# Patient Record
Sex: FEMALE | Race: WHITE | HISPANIC OR LATINO | Employment: FULL TIME | URBAN - METROPOLITAN AREA
[De-identification: names, ages, dates, MRNs, and addresses within clinical notes are randomized per-mention and may not be internally consistent; named-entity substitution may affect disease eponyms.]

---

## 2020-01-22 DIAGNOSIS — Z12.31 ENCOUNTER FOR SCREENING MAMMOGRAM FOR MALIGNANT NEOPLASM OF BREAST: Primary | ICD-10-CM

## 2020-01-23 ENCOUNTER — HOSPITAL ENCOUNTER (OUTPATIENT)
Dept: RADIOLOGY | Facility: HOSPITAL | Age: 45
Discharge: HOME | End: 2020-01-23
Attending: NURSE PRACTITIONER

## 2020-01-23 DIAGNOSIS — Z12.31 ENCOUNTER FOR SCREENING MAMMOGRAM FOR MALIGNANT NEOPLASM OF BREAST: ICD-10-CM

## 2020-01-23 PROCEDURE — 77067 SCR MAMMO BI INCL CAD: CPT | Mod: TC,PO

## 2022-02-19 ENCOUNTER — HOSPITAL ENCOUNTER (EMERGENCY)
Facility: HOSPITAL | Age: 47
Discharge: HOME | End: 2022-02-19
Attending: EMERGENCY MEDICINE

## 2022-02-19 VITALS
RESPIRATION RATE: 18 BRPM | OXYGEN SATURATION: 99 % | DIASTOLIC BLOOD PRESSURE: 62 MMHG | TEMPERATURE: 98 F | SYSTOLIC BLOOD PRESSURE: 100 MMHG | HEART RATE: 67 BPM

## 2022-02-19 DIAGNOSIS — R10.2 PELVIC PAIN: ICD-10-CM

## 2022-02-19 DIAGNOSIS — N83.209 CYST OF OVARY, UNSPECIFIED LATERALITY: ICD-10-CM

## 2022-02-19 DIAGNOSIS — R10.31 RIGHT LOWER QUADRANT ABDOMINAL PAIN: Primary | ICD-10-CM

## 2022-02-19 LAB
ALBUMIN SERPL BCP-MCNC: 4.5 G/DL (ref 3.5–5.2)
ALP SERPL-CCNC: 45 U/L (ref 55–135)
ALT SERPL W/O P-5'-P-CCNC: 14 U/L (ref 10–44)
ANION GAP SERPL CALC-SCNC: 7 MMOL/L (ref 8–16)
AST SERPL-CCNC: 18 U/L (ref 10–40)
BASOPHILS # BLD AUTO: 0.03 K/UL (ref 0–0.2)
BASOPHILS NFR BLD: 0.5 % (ref 0–1.9)
BILIRUB SERPL-MCNC: 0.7 MG/DL (ref 0.1–1)
BILIRUB UR QL STRIP: NEGATIVE
BUN SERPL-MCNC: 11 MG/DL (ref 6–20)
CALCIUM SERPL-MCNC: 9.2 MG/DL (ref 8.7–10.5)
CHLORIDE SERPL-SCNC: 105 MMOL/L (ref 95–110)
CLARITY UR: CLEAR
CO2 SERPL-SCNC: 25 MMOL/L (ref 23–29)
COLOR UR: COLORLESS
CREAT SERPL-MCNC: 0.7 MG/DL (ref 0.5–1.4)
DIFFERENTIAL METHOD: NORMAL
EOSINOPHIL # BLD AUTO: 0.2 K/UL (ref 0–0.5)
EOSINOPHIL NFR BLD: 2.7 % (ref 0–8)
ERYTHROCYTE [DISTWIDTH] IN BLOOD BY AUTOMATED COUNT: 13.8 % (ref 11.5–14.5)
EST. GFR  (AFRICAN AMERICAN): >60 ML/MIN/1.73 M^2
EST. GFR  (NON AFRICAN AMERICAN): >60 ML/MIN/1.73 M^2
GLUCOSE SERPL-MCNC: 90 MG/DL (ref 70–110)
GLUCOSE UR QL STRIP: NEGATIVE
HCT VFR BLD AUTO: 40.1 % (ref 37–48.5)
HGB BLD-MCNC: 13.4 G/DL (ref 12–16)
HGB UR QL STRIP: ABNORMAL
IMM GRANULOCYTES # BLD AUTO: 0.02 K/UL (ref 0–0.04)
IMM GRANULOCYTES NFR BLD AUTO: 0.3 % (ref 0–0.5)
KETONES UR QL STRIP: NEGATIVE
LEUKOCYTE ESTERASE UR QL STRIP: NEGATIVE
LIPASE SERPL-CCNC: 42 U/L (ref 4–60)
LYMPHOCYTES # BLD AUTO: 1.4 K/UL (ref 1–4.8)
LYMPHOCYTES NFR BLD: 23.8 % (ref 18–48)
MAGNESIUM SERPL-MCNC: 2 MG/DL (ref 1.6–2.6)
MCH RBC QN AUTO: 28.4 PG (ref 27–31)
MCHC RBC AUTO-ENTMCNC: 33.4 G/DL (ref 32–36)
MCV RBC AUTO: 85 FL (ref 82–98)
MONOCYTES # BLD AUTO: 0.4 K/UL (ref 0.3–1)
MONOCYTES NFR BLD: 7.2 % (ref 4–15)
NEUTROPHILS # BLD AUTO: 3.8 K/UL (ref 1.8–7.7)
NEUTROPHILS NFR BLD: 65.5 % (ref 38–73)
NITRITE UR QL STRIP: NEGATIVE
NRBC BLD-RTO: 0 /100 WBC
PH UR STRIP: 6 [PH] (ref 5–8)
PLATELET # BLD AUTO: 226 K/UL (ref 150–450)
PMV BLD AUTO: 11 FL (ref 9.2–12.9)
POTASSIUM SERPL-SCNC: 3.8 MMOL/L (ref 3.5–5.1)
PROT SERPL-MCNC: 7.8 G/DL (ref 6–8.4)
PROT UR QL STRIP: NEGATIVE
RBC # BLD AUTO: 4.72 M/UL (ref 4–5.4)
SODIUM SERPL-SCNC: 137 MMOL/L (ref 136–145)
SP GR UR STRIP: 1 (ref 1–1.03)
URN SPEC COLLECT METH UR: ABNORMAL
UROBILINOGEN UR STRIP-ACNC: NEGATIVE EU/DL
WBC # BLD AUTO: 5.83 K/UL (ref 3.9–12.7)

## 2022-02-19 PROCEDURE — 83690 ASSAY OF LIPASE: CPT | Performed by: EMERGENCY MEDICINE

## 2022-02-19 PROCEDURE — 81003 URINALYSIS AUTO W/O SCOPE: CPT | Performed by: EMERGENCY MEDICINE

## 2022-02-19 PROCEDURE — 85025 COMPLETE CBC W/AUTO DIFF WBC: CPT | Performed by: EMERGENCY MEDICINE

## 2022-02-19 PROCEDURE — 25500020 PHARM REV CODE 255: Performed by: EMERGENCY MEDICINE

## 2022-02-19 PROCEDURE — 99285 EMERGENCY DEPT VISIT HI MDM: CPT | Mod: 25

## 2022-02-19 PROCEDURE — 80053 COMPREHEN METABOLIC PANEL: CPT | Performed by: EMERGENCY MEDICINE

## 2022-02-19 PROCEDURE — 83735 ASSAY OF MAGNESIUM: CPT | Performed by: EMERGENCY MEDICINE

## 2022-02-19 RX ADMIN — IOHEXOL 100 ML: 350 INJECTION, SOLUTION INTRAVENOUS at 01:02

## 2022-02-19 NOTE — DISCHARGE INSTRUCTIONS
Motrin or Tylenol for pain  Please follow-up your primary care provider and OBGYN as discussed  Return if your condition becomes worse, he developed fever, vomiting, or any concerns

## 2022-02-19 NOTE — ED NOTES
46 YOF c/o RLQ pain 8/10, neck pain and right lower back pain, and nauseated X 3 days with no improvement. Also c/o dysuria. Pt stated no relief with OTC medication. -v/d. PMH Cervical Ca.  Denies any other complaints.     Adult Physical Assessment  LOC: Patient is awake, alert, oriented and speaking appropriately at this time.  APPEARANCE: Patient resting comfortably and appears to be in no acute distress at this time. Patient is clean and well groomed, patient's clothing is properly fastened.  SKIN:The skin is warm and dry, color consistent with ethnicity, patient has normal skin turgor and moist mucus membranes, skin intact, no breakdown or brusing noted.  MUSCULOSKELETAL: Patient moving all extremities well, no obvious swelling or deformities noted.  RESPIRATORY: Airway is open and patent, respirations are spontaneous, patient has a normal effort and rate, no accessory muscle use noted.  CARDIAC: Patient has a normal rate and rhythm, no periphreal edema noted in any extremity, capillary refill < 3 seconds in all extremities.  ABDOMEN: Soft and non tender to palpation, no abdominal distention noted.  NEUROLOGIC: Eyes open spontaneously, behavior appropriate to situation, follows commands, facial expression symmetrical, bilateral hand grasp equal and even, purposeful motor response noted, normal sensation in all extremities when touched with a finger.      VSS. ED workup in progress. Call light within pt reach. Safety measures in place: side rails up X2. Will continue to monitor.

## 2022-02-19 NOTE — ED PROVIDER NOTES
Encounter Date: 2/19/2022       History     Chief Complaint   Patient presents with    Abdominal Pain     X 3 DAYS    Back Pain    Vomiting    Dysuria     46-year-old  speaking female presents emergency department with complaint of right lower quadrant abdominal pain for the last 3 days with associated nausea.  Patient denies vomiting or diarrhea.  She has had no fever during her illness.  Patient has had a previous partial hysterectomy she reports that she has had a diagnosis of ovarian cancer in 2017 she did receive chemotherapy.  Although the triage note reports dysuria patient denies difficulty urinating she does have a urine sample noted on the counter which is essentially clear.  The patient has had no vaginal discharge or bleeding.  She also is complaining of some right thoracic pain, and right lower back pain, right lateral neck pain.        Review of patient's allergies indicates:  No Known Allergies  Past Medical History:   Diagnosis Date    Cancer      Past Surgical History:   Procedure Laterality Date    HYSTERECTOMY       No family history on file.     Review of Systems   Constitutional: Negative for chills and fever.   HENT: Negative.    Respiratory: Negative.    Cardiovascular: Negative.    Gastrointestinal: Positive for abdominal pain and nausea. Negative for diarrhea and vomiting.   Genitourinary: Positive for flank pain.   Musculoskeletal: Positive for back pain.   Skin: Negative.    Allergic/Immunologic: Negative.    Neurological: Negative.    Hematological: Negative.    Psychiatric/Behavioral: Negative.    All other systems reviewed and are negative.      Physical Exam     Initial Vitals [02/19/22 1140]   BP Pulse Resp Temp SpO2   120/70 72 20 98.5 °F (36.9 °C) 100 %      MAP       --         Physical Exam    Nursing note and vitals reviewed.  Constitutional: She appears well-developed and well-nourished.   HENT:   Head: Normocephalic.   Right Ear: External ear normal.   Left Ear:  External ear normal.   Eyes: EOM are normal. Pupils are equal, round, and reactive to light.   Neck: Neck supple.   Normal range of motion.  Cardiovascular: Normal rate, regular rhythm, normal heart sounds and intact distal pulses.   Abdominal: Abdomen is soft. Bowel sounds are normal. There is abdominal tenderness in the right lower quadrant.   Mild tenderness to the right lower quadrant   There is right CVA tenderness.  Musculoskeletal:         General: Normal range of motion.      Cervical back: Normal range of motion and neck supple.     Neurological: She is alert and oriented to person, place, and time. She has normal strength. GCS score is 15. GCS eye subscore is 4. GCS verbal subscore is 5. GCS motor subscore is 6.   Skin: Skin is warm and dry. No rash noted.   Psychiatric: She has a normal mood and affect.         ED Course   Procedures  Labs Reviewed   URINALYSIS, REFLEX TO URINE CULTURE - Abnormal; Notable for the following components:       Result Value    Color, UA Colorless (*)     Occult Blood UA Trace (*)     All other components within normal limits    Narrative:     Specimen Source->Urine   COMPREHENSIVE METABOLIC PANEL - Abnormal; Notable for the following components:    Alkaline Phosphatase 45 (*)     Anion Gap 7 (*)     All other components within normal limits   CBC W/ AUTO DIFFERENTIAL   LIPASE   MAGNESIUM          Imaging Results          US Pelvis Comp with Transvag NON-OB (xpd) (Final result)  Result time 02/19/22 15:15:29   Procedure changed from US Pelvis Complete Non OB     Final result by Abdiel Ware Jr., MD (02/19/22 15:15:29)                 Narrative:    Examination: Pelvic ultrasound (transabdominal and transvaginal)    CLINICAL HISTORY: Right lower quadrant pain    TECHNIQUE: Realtime sonographic evaluation of the pelvic organs was employed utilizing both gray scale and color flow imaging.    FINDINGS: Post hysterectomy pelvis with normal appearance of both ovaries which  maintain normal echogenicity and overall Doppler signal on Doppler inspection. No free fluid or adnexal masses are identified within either side the pelvic cavity. No indication of torsion based on Doppler inspection.    IMPRESSION: Unremarkable post hysterectomy pelvis. Both ovaries are normal in sonographic appearance.    Electronically signed by:  Abdiel Ware MD  2/19/2022 3:15 PM CST Workstation: [id number]                             CT Abdomen Pelvis With Contrast (Final result)  Result time 02/19/22 14:01:05    Final result by Abdiel Ware Jr., MD (02/19/22 14:01:05)                 Narrative:    EXAM: CT ABDOMEN AND PELVIS WITH CONTRAST CT ABDOMEN PELVIS WITH CONTRAST    CLINICAL INDICATION: Female, 46 years old. RLQ abdominal pain (Age >= 14y)    TECHNIQUE: CT abdomen and pelvis was performed, following the administration of contrast, as per department protocol. Axial, sagittal, and coronal reconstructions were obtained.    IV CONTRAST: 100 cc of Omnipaque 350    ORAL CONTRAST: Yes.    RADIATION DOSE REDUCTION:This exam was performed according to the departmental dose-optimization program which includes automated exposure control, adjustment of the mA and/or kV according to patient size and/or use of iterative reconstruction technique.    COMPARISON: None    FINDINGS:  LOWER CHEST:  No pathologic process in imaged portion of lower chest    LIVER:  No pathologic process.    GALLBLADDER:  Normal distended without pathologic findings. No wall thickening, pericholecystic fluid, or intraluminal stone formation.    BILE DUCTS:  No pathologic process.    PANCREAS:  No pathologic process.    SPLEEN:  No pathologic process.    ADRENALS:  No pathologic process.    KIDNEYS AND URETERS:  No pathologic process.    URINARY BLADDER:  No pathologic process.    GASTROINTESTINAL TRACT:  No pathologic process.    APPENDIX:  No inflammatory changes in region of appendix.    LYMPH NODES:  No  lymphadenopathy.    PERITONEUM/MESENTERY:  No free air, significant free fluid, mass or fluid collection.    VESSELS:  No vascular abnormality.    ADDITIONAL RETROPERITONEAL FINDINGS:  None.    REPRODUCTIVE ORGANS:There is a small crenulated focus of hyperenhancement occupying the left ovary measuring 1.5 cm in the widest dimension compatible with an involuting cyst formation. Right ovary is normal in size and overall contour. The patient has undergone previous hysterectomy. There is mild thickening at the level of the vaginal apex.    ABDOMINAL AND PELVIC WALLS:  No pathologic process.    MUSCULOSKELETAL:  No pathologic process.    ADDITIONAL FINDINGS:  None.    IMPRESSION:    1. No CT evidence of acute intra-abdominal pathology.  2. 1 cm crenulated focus encompassing the left ovary compatible with a involuting cyst.    Standardized Report:  [id number].    Electronically signed by:  Abdiel Ware MD  2/19/2022 2:01 PM CST Workstation: [id number]                             CTA Chest Non-Coronary - PE Study (Final result)  Result time 02/19/22 13:55:09    Final result by Abdiel Ware Jr., MD (02/19/22 13:55:09)                 Narrative:    CMS MANDATED QUALITY DATA-CT RADIATION-436    HISTORY: Patient document history of pulmonary embolism suspected. Indeterminate d-dimer status.    TECHNIQUE:: Thin axial imaging through the chest was performed with 100 cc of Omnipaque 350 IV contrast, with sagittal and coronal images, MIPS, and or 3D reconstructions performed. All CT exams at this facility use dose modulation, iterative reconstruction, and or weight based dosing when appropriate to reduce radiation dose to as low as reasonably achievable.    FINDINGS:    Examination is of diagnostic quality as there is normal opacification of the pulmonary arterial tree out to the tertiary order branch vessels. No evidence of pruning, webbing, or truncation of pulmonary arterial system. No pulmonary  thromboembolism. The main pulmonary trunk appears normal and there is no evidence of saddle embolus branch point of the main pulmonary artery. Right and left pulmonary arteries and respective divisions are normal in caliber without evidence of intraluminal occlusion or thrombus formation.    Parenchymal lung windows settings demonstrate geographic areas of groundglass opacity changes on the long-standing basis intermixed amongst areas of normal-appearing lung may reflect small airway disease with constricted alveolitis and retention of air due to small airway disease. No evidence of lobar or sublobar consolidation changes. No pneumonia.    Evaluation of the mediastinum maintains normal appearance of the aortic outline as well as the major pulmonary vessels. No lobe lymphadenopathy.    Upper abdominal cavity is normal in CT appearance and the gallbladder is mildly distended without pathologic findings.    IMPRESSION:  1. No CT evidence of acute pulmonary thromboembolism.  2. 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  3. No pneumonia.    Electronically signed by:  Abdiel Ware MD  2/19/2022 1:55 PM CST Workstation: [id number]                             X-Ray Chest AP Portable (Final result)  Result time 02/19/22 12:42:08    Final result by Abdiel Ware Jr., MD (02/19/22 12:42:08)                 Narrative:    CHEST X-RAY SINGLE VIEW    HISTORY: Chest pain    FINDINGS:   A single view of the chest was performed without the benefit of previous comparison.  The heart size and pulmonary vascularity are within the range of normal.  There is no significant hilar nor mediastinal process.  The aerated lungs are well expanded and clear.  The right and left CP angles are rather sharp.  The osseous structures show nothing unusual.    IMPRESSION:   Negative  chest.    Electronically signed by:  Abdiel Ware MD  2/19/2022 12:42 PM CST Workstation: [id number]                               Medications   iohexoL (OMNIPAQUE 350) injection 100 mL (100 mLs Intravenous Given 2/19/22 1575)     Medical Decision Making:   Initial Assessment:   46-year-old  speaking female presents emergency department with complaint of right lower quadrant abdominal pain for the last 3 days with associated nausea.  Patient denies vomiting or diarrhea.  She has had no fever during her illness.  Patient has had a previous partial hysterectomy she reports that she has had a diagnosis of ovarian cancer in 2017 she did receive chemotherapy.  Although the triage note reports dysuria patient denies difficulty urinating she does have a urine sample noted on the counter which is essentially clear.  The patient has had no vaginal discharge or bleeding.  She also is complaining of some right thoracic pain, and right lower back pain, right lateral neck pain.        Differential Diagnosis:   Appendicitis, right ovarian cyst, torsion, UTI, ureterolithiasis, pyelonephritis, PE  ED Management:  46-year-old  speaking female presents to the emergency department Stephanie language line was used for my history and physical and discharge instructions.  This patient has had a 3 day history of right lower abdominal pain with associated nausea denies any vomiting or diarrhea patient has had no fever.  Patient denies dark tarry stools she has had a previous partial hysterectomy secondary to cervical cancer in 2017 she did have chemotherapy.  Patient states she has no current gyn.  Patient's labs unremarkable urinalysis reveals no urinary tract infection CT imaging of the chest performed secondary to patient complaining pain to the right thoracic back however the pain is reproducible with palpation on exam however secondary to patient's past medical history of cervical CA and increased risk of PE CTA  performed there is no evidence of pulmonary embolism noted.  Further evaluation CT of the abdomen and pelvis performed no evidence of acute appendicitis patient does have a small left ovarian cyst.  Pelvic ultrasound performed no evidence of ovarian torsion noted.  Patient was given instructions and discharged via Stephanie language line she was instructed that she could have an early appendicitis that she must return if she develops fever, worsening pain, vomiting, or any concerns she was also instructed to follow-up with gyn secondary to previous history of cervical CA.             Attending Attestation:     Physician Attestation Statement for NP/PA:       Other NP/PA Attestation Additions:    History of Present Illness: I was not called upon to see this patient but was available for consultation               ED Course as of 02/19/22 1744   Sat Feb 19, 2022   1500 Waiting for ultrasound report  [MP]      ED Course User Index  [MP] JANE Mccarty             Clinical Impression:   Final diagnoses:  [R10.31] Right lower quadrant abdominal pain (Primary)  [N83.209] Cyst of ovary, unspecified laterality  [R10.2] Pelvic pain          ED Disposition Condition    Discharge Stable        ED Prescriptions     None        Follow-up Information     Follow up With Specialties Details Why Contact Info    Snehal Cast NP Internal Medicine Schedule an appointment as soon as possible for a visit in 2 days  [address] [facility] [address]  [place] [address]  [phone]      Noelle Lee MD Obstetrics, Obstetrics and Gynecology Schedule an appointment as soon as possible for a visit in 2 days  [address] [facility]  [address] [place] [address] OBSTETRIC & GYNECOLOGY  [place] [address]  [phone]             JANE Mccarty  02/19/22 5160       Torrey Diaz MD  02/19/22 1233

## 2022-09-25 ENCOUNTER — HOSPITAL ENCOUNTER (EMERGENCY)
Facility: HOSPITAL | Age: 47
Discharge: HOME | End: 2022-09-25
Attending: EMERGENCY MEDICINE

## 2022-09-25 VITALS
HEIGHT: 60 IN | HEART RATE: 78 BPM | SYSTOLIC BLOOD PRESSURE: 120 MMHG | TEMPERATURE: 97 F | OXYGEN SATURATION: 100 % | DIASTOLIC BLOOD PRESSURE: 78 MMHG | WEIGHT: 140 LBS | RESPIRATION RATE: 16 BRPM | BODY MASS INDEX: 27.48 KG/M2

## 2022-09-25 DIAGNOSIS — L03.90 CELLULITIS, UNSPECIFIED CELLULITIS SITE: ICD-10-CM

## 2022-09-25 DIAGNOSIS — L02.91 ABSCESS: Primary | ICD-10-CM

## 2022-09-25 PROCEDURE — 99283 EMERGENCY DEPT VISIT LOW MDM: CPT

## 2022-09-25 PROCEDURE — 25000003 PHARM REV CODE 250: Performed by: EMERGENCY MEDICINE

## 2022-09-25 RX ORDER — SULFAMETHOXAZOLE AND TRIMETHOPRIM 800; 160 MG/1; MG/1
1 TABLET ORAL 2 TIMES DAILY
Qty: 20 TABLET | Refills: 0 | Status: SHIPPED | OUTPATIENT
Start: 2022-09-25 | End: 2022-10-05

## 2022-09-25 RX ORDER — MUPIROCIN 20 MG/G
OINTMENT TOPICAL 3 TIMES DAILY
Qty: 22 G | Refills: 0 | Status: SHIPPED | OUTPATIENT
Start: 2022-09-25 | End: 2022-10-05

## 2022-09-25 RX ORDER — SULFAMETHOXAZOLE AND TRIMETHOPRIM 800; 160 MG/1; MG/1
1 TABLET ORAL 2 TIMES DAILY
Qty: 14 TABLET | Refills: 0 | Status: SHIPPED | OUTPATIENT
Start: 2022-09-25 | End: 2022-09-25 | Stop reason: SDUPTHER

## 2022-09-25 RX ORDER — SULFAMETHOXAZOLE AND TRIMETHOPRIM 800; 160 MG/1; MG/1
1 TABLET ORAL
Status: COMPLETED | OUTPATIENT
Start: 2022-09-25 | End: 2022-09-25

## 2022-09-25 RX ADMIN — SULFAMETHOXAZOLE AND TRIMETHOPRIM 1 TABLET: 800; 160 TABLET ORAL at 09:09

## 2022-09-26 NOTE — ED PROVIDER NOTES
Encounter Date: 9/25/2022       History     Chief Complaint   Patient presents with    Wound Check     Wound to left leg, patient states it started as a mosquito bite 3 days ago, pt reports fever at home,      Patient presents complaining of left anterior shin redness and discomfort.  Symptoms have been present for last few days.  She denies fever chills.  She has some mild pain in the left groin.  She denies any streaking up the leg.  At the worst symptoms are mild-to-moderate.    Review of patient's allergies indicates:  No Known Allergies  Past Medical History:   Diagnosis Date    Cancer      Past Surgical History:   Procedure Laterality Date    HYSTERECTOMY       No family history on file.  Social History     Tobacco Use    Smoking status: Never   Substance Use Topics    Alcohol use: No    Drug use: No     Review of Systems   All other systems reviewed and are negative.    Physical Exam     Initial Vitals [09/25/22 2117]   BP Pulse Resp Temp SpO2   120/83 78 16 97.3 °F (36.3 °C) 100 %      MAP       --         Physical Exam    Nursing note and vitals reviewed.  Constitutional: She appears well-developed and well-nourished.   Pleasant, polite   HENT:   Head: Normocephalic and atraumatic.   Eyes: EOM are normal.   Neck: Neck supple.   Normal range of motion.  Pulmonary/Chest: No respiratory distress.   Musculoskeletal:      Cervical back: Normal range of motion and neck supple.      Comments: The left anterior shin has an area of erythema and warmth with associated mild purulent drainage.  There is no fluctuance.  There is no streaking up the leg.     Neurological: She is alert and oriented to person, place, and time.   Skin: Skin is warm and dry. Capillary refill takes less than 2 seconds.   Psychiatric: She has a normal mood and affect. Her behavior is normal. Judgment and thought content normal.       ED Course   Procedures  Labs Reviewed - No data to display       Imaging Results    None          Medications    sulfamethoxazole-trimethoprim 800-160mg per tablet 1 tablet (1 tablet Oral Given 9/25/22 2141)     Medical Decision Making:   Initial Assessment:   No apparent distress  Differential Diagnosis:   Abscess, cellulitis  ED Management:  Patient has no need for incision and drainage at the time patient has open draining abscess with some mild surrounding cellulitis.  She was provided Bactrim in the emergency department and was given prescription for Bactrim.  Patient be discharged stable condition.  Detailed return precautions discussed                        Clinical Impression:   Final diagnoses:  [L02.91] Abscess (Primary)  [L03.90] Cellulitis, unspecified cellulitis site        ED Disposition Condition    Discharge Stable          ED Prescriptions       Medication Sig Dispense Start Date End Date Auth. Provider    sulfamethoxazole-trimethoprim 800-160mg (BACTRIM DS) 800-160 mg Tab  (Status: Discontinued) Take 1 tablet by mouth 2 (two) times daily. for 10 days 14 tablet 9/25/2022 9/25/2022 Torrey King MD    sulfamethoxazole-trimethoprim 800-160mg (BACTRIM DS) 800-160 mg Tab Take 1 tablet by mouth 2 (two) times daily. for 10 days 20 tablet 9/25/2022 10/5/2022 Torrey King MD    mupirocin (BACTROBAN) 2 % ointment Apply topically 3 (three) times daily. for 10 days 22 g 9/25/2022 10/5/2022 Torrey King MD          Follow-up Information       Follow up With Specialties Details Why Contact Info    Snehal Cast NP Internal Medicine In 1 week  [address] [place] [id number]  [phone]               Torrey King MD  09/25/22 3326

## 2024-04-08 ENCOUNTER — HOSPITAL ENCOUNTER (EMERGENCY)
Facility: HOSPITAL | Age: 49
Discharge: HOME | End: 2024-04-08
Attending: EMERGENCY MEDICINE

## 2024-04-08 VITALS
DIASTOLIC BLOOD PRESSURE: 70 MMHG | HEIGHT: 64 IN | HEART RATE: 72 BPM | TEMPERATURE: 98 F | SYSTOLIC BLOOD PRESSURE: 108 MMHG | OXYGEN SATURATION: 98 % | WEIGHT: 160 LBS | BODY MASS INDEX: 27.31 KG/M2 | RESPIRATION RATE: 16 BRPM

## 2024-04-08 DIAGNOSIS — R10.84 GENERALIZED ABDOMINAL PAIN: Primary | ICD-10-CM

## 2024-04-08 DIAGNOSIS — K52.9 COLITIS: ICD-10-CM

## 2024-04-08 LAB
ALBUMIN SERPL BCP-MCNC: 4.2 G/DL (ref 3.5–5.2)
ALP SERPL-CCNC: 44 U/L (ref 55–135)
ALT SERPL W/O P-5'-P-CCNC: 9 U/L (ref 10–44)
ANION GAP SERPL CALC-SCNC: 5 MMOL/L (ref 8–16)
AST SERPL-CCNC: 15 U/L (ref 10–40)
BACTERIA #/AREA URNS HPF: ABNORMAL /HPF
BASOPHILS # BLD AUTO: 0.04 K/UL (ref 0–0.2)
BASOPHILS NFR BLD: 0.3 % (ref 0–1.9)
BILIRUB SERPL-MCNC: 0.3 MG/DL (ref 0.1–1)
BILIRUB UR QL STRIP: NEGATIVE
BUN SERPL-MCNC: 20 MG/DL (ref 6–20)
CALCIUM SERPL-MCNC: 9.4 MG/DL (ref 8.7–10.5)
CHLORIDE SERPL-SCNC: 108 MMOL/L (ref 95–110)
CLARITY UR: CLEAR
CO2 SERPL-SCNC: 26 MMOL/L (ref 23–29)
COLOR UR: YELLOW
CREAT SERPL-MCNC: 0.7 MG/DL (ref 0.5–1.4)
DIFFERENTIAL METHOD BLD: ABNORMAL
EOSINOPHIL # BLD AUTO: 0.1 K/UL (ref 0–0.5)
EOSINOPHIL NFR BLD: 0.5 % (ref 0–8)
ERYTHROCYTE [DISTWIDTH] IN BLOOD BY AUTOMATED COUNT: 13.9 % (ref 11.5–14.5)
EST. GFR  (NO RACE VARIABLE): >60 ML/MIN/1.73 M^2
GLUCOSE SERPL-MCNC: 127 MG/DL (ref 70–110)
GLUCOSE UR QL STRIP: NEGATIVE
HCT VFR BLD AUTO: 37.5 % (ref 37–48.5)
HGB BLD-MCNC: 12.4 G/DL (ref 12–16)
HGB UR QL STRIP: ABNORMAL
IMM GRANULOCYTES # BLD AUTO: 0.02 K/UL (ref 0–0.04)
IMM GRANULOCYTES NFR BLD AUTO: 0.2 % (ref 0–0.5)
KETONES UR QL STRIP: NEGATIVE
LEUKOCYTE ESTERASE UR QL STRIP: NEGATIVE
LIPASE SERPL-CCNC: 39 U/L (ref 4–60)
LYMPHOCYTES # BLD AUTO: 1.1 K/UL (ref 1–4.8)
LYMPHOCYTES NFR BLD: 8.7 % (ref 18–48)
MCH RBC QN AUTO: 29.1 PG (ref 27–31)
MCHC RBC AUTO-ENTMCNC: 33.1 G/DL (ref 32–36)
MCV RBC AUTO: 88 FL (ref 82–98)
MICROSCOPIC COMMENT: ABNORMAL
MONOCYTES # BLD AUTO: 0.6 K/UL (ref 0.3–1)
MONOCYTES NFR BLD: 4.5 % (ref 4–15)
NEUTROPHILS # BLD AUTO: 10.8 K/UL (ref 1.8–7.7)
NEUTROPHILS NFR BLD: 85.8 % (ref 38–73)
NITRITE UR QL STRIP: NEGATIVE
NRBC BLD-RTO: 0 /100 WBC
PH UR STRIP: 6 [PH] (ref 5–8)
PLATELET # BLD AUTO: 212 K/UL (ref 150–450)
PMV BLD AUTO: 11.3 FL (ref 9.2–12.9)
POTASSIUM SERPL-SCNC: 3.7 MMOL/L (ref 3.5–5.1)
PROT SERPL-MCNC: 7 G/DL (ref 6–8.4)
PROT UR QL STRIP: ABNORMAL
RBC # BLD AUTO: 4.26 M/UL (ref 4–5.4)
RBC #/AREA URNS HPF: 10 /HPF (ref 0–4)
SODIUM SERPL-SCNC: 139 MMOL/L (ref 136–145)
SP GR UR STRIP: 1.02 (ref 1–1.03)
SQUAMOUS #/AREA URNS HPF: 8 /HPF
URN SPEC COLLECT METH UR: ABNORMAL
UROBILINOGEN UR STRIP-ACNC: NEGATIVE EU/DL
WBC # BLD AUTO: 12.59 K/UL (ref 3.9–12.7)
WBC #/AREA URNS HPF: 7 /HPF (ref 0–5)

## 2024-04-08 PROCEDURE — 96361 HYDRATE IV INFUSION ADD-ON: CPT

## 2024-04-08 PROCEDURE — 80053 COMPREHEN METABOLIC PANEL: CPT

## 2024-04-08 PROCEDURE — 25500020 PHARM REV CODE 255: Performed by: EMERGENCY MEDICINE

## 2024-04-08 PROCEDURE — 51701 INSERT BLADDER CATHETER: CPT

## 2024-04-08 PROCEDURE — 81001 URINALYSIS AUTO W/SCOPE: CPT

## 2024-04-08 PROCEDURE — 83690 ASSAY OF LIPASE: CPT

## 2024-04-08 PROCEDURE — 85025 COMPLETE CBC W/AUTO DIFF WBC: CPT

## 2024-04-08 PROCEDURE — 96374 THER/PROPH/DIAG INJ IV PUSH: CPT

## 2024-04-08 PROCEDURE — 99285 EMERGENCY DEPT VISIT HI MDM: CPT | Mod: 25

## 2024-04-08 PROCEDURE — 63600175 PHARM REV CODE 636 W HCPCS: Performed by: EMERGENCY MEDICINE

## 2024-04-08 PROCEDURE — 25000003 PHARM REV CODE 250: Performed by: EMERGENCY MEDICINE

## 2024-04-08 RX ORDER — LEVOFLOXACIN 750 MG/1
750 TABLET ORAL DAILY
Qty: 7 TABLET | Refills: 0 | Status: SHIPPED | OUTPATIENT
Start: 2024-04-08

## 2024-04-08 RX ORDER — SODIUM CHLORIDE 9 MG/ML
1000 INJECTION, SOLUTION INTRAVENOUS
Status: COMPLETED | OUTPATIENT
Start: 2024-04-08 | End: 2024-04-08

## 2024-04-08 RX ORDER — ONDANSETRON HYDROCHLORIDE 2 MG/ML
4 INJECTION, SOLUTION INTRAVENOUS
Status: COMPLETED | OUTPATIENT
Start: 2024-04-08 | End: 2024-04-08

## 2024-04-08 RX ORDER — METRONIDAZOLE 500 MG/1
500 TABLET ORAL 2 TIMES DAILY
Qty: 14 TABLET | Refills: 0 | Status: SHIPPED | OUTPATIENT
Start: 2024-04-08 | End: 2024-04-15

## 2024-04-08 RX ADMIN — IOHEXOL 100 ML: 350 INJECTION, SOLUTION INTRAVENOUS at 05:04

## 2024-04-08 RX ADMIN — ONDANSETRON 4 MG: 2 INJECTION INTRAMUSCULAR; INTRAVENOUS at 06:04

## 2024-04-08 RX ADMIN — SODIUM CHLORIDE 1000 ML: 9 INJECTION, SOLUTION INTRAVENOUS at 06:04

## 2024-04-08 NOTE — ED PROVIDER NOTES
Encounter Date: 4/8/2024       History     Chief Complaint   Patient presents with    Abdominal Pain     Pain began 1 hr pta.    Diarrhea     Chief complaint is lower abdominal pain mild headache since Sunday with multiple episodes of vomiting and diarrhea watery brown.    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        Review of patient's allergies indicates:  No Known Allergies  Past Medical History:   Diagnosis Date    Cancer      Past Surgical History:   Procedure Laterality Date    HYSTERECTOMY       No family history on file.  Social History     Tobacco Use    Smoking status: Never   Substance Use Topics    Alcohol use: No    Drug use: No     Review of Systems   Constitutional:  Negative for chills and fever.   HENT:  Negative for ear pain, rhinorrhea and sore throat.    Eyes:  Negative for pain and visual disturbance.   Respiratory:  Negative for cough and shortness of breath.    Cardiovascular:  Negative for chest pain and palpitations.   Gastrointestinal:  Positive for abdominal pain, diarrhea and vomiting. Negative for constipation and nausea.   Genitourinary:  Negative for dysuria, frequency, hematuria and urgency.   Musculoskeletal:  Negative for back pain, joint swelling and myalgias.   Skin:  Negative for rash.   Neurological:  Negative for dizziness, seizures, weakness and headaches.   Psychiatric/Behavioral:  Negative for dysphoric mood. The patient is not nervous/anxious.        Physical Exam     Initial Vitals   BP Pulse Resp Temp SpO2   04/08/24 0026 04/08/24 0026 04/08/24 0026 04/08/24 0034 04/08/24 0026   124/62 80 14 97.9 °F (36.6 °C) 100 %      MAP       --                Physical Exam    Nursing note and vitals reviewed.  Constitutional: She appears well-developed and well-nourished.   HENT:    Head: Normocephalic and atraumatic.   Nose: Nose normal.   Mouth/Throat: Oropharynx is clear and moist.   Eyes: Conjunctivae and EOM are normal. Pupils are equal, round, and reactive to light.   Neck: Neck supple. No thyromegaly present. No tracheal deviation present.   Normal range of motion.  Cardiovascular:  Normal rate, regular rhythm, normal heart sounds and intact distal pulses.     Exam reveals no gallop and no friction rub.       No murmur heard.  Pulmonary/Chest: No stridor. No respiratory distress.   Course bilateral breath sounds no adventitious   Abdominal: Abdomen is soft. Bowel sounds are normal. She exhibits no distension and no mass. There is abdominal tenderness.   Diffuse generalized tenderness, no rebound, no guarding noted. There is no rebound and no guarding.   Musculoskeletal:         General: No edema. Normal range of motion.      Cervical back: Normal range of motion and neck supple.     Lymphadenopathy:     She has no cervical adenopathy.   Neurological: She is alert and oriented to person, place, and time. She has normal strength and normal reflexes. GCS score is 15. GCS eye subscore is 4. GCS verbal subscore is 5. GCS motor subscore is 6.   Skin: Skin is warm and dry. Capillary refill takes less than 2 seconds.   Psychiatric: She has a normal mood and affect.         ED Course   Procedures  Labs Reviewed   CBC W/ AUTO DIFFERENTIAL - Abnormal; Notable for the following components:       Result Value    Gran # (ANC) 10.8 (*)     Gran % 85.8 (*)     Lymph % 8.7 (*)     All other components within normal limits   COMPREHENSIVE METABOLIC PANEL - Abnormal; Notable for the following components:    Glucose 127 (*)     Alkaline Phosphatase 44 (*)     ALT 9 (*)     Anion Gap 5 (*)     All other components within normal limits   URINALYSIS, REFLEX TO URINE CULTURE - Abnormal; Notable for the following components:    Protein, UA Trace (*)     Occult Blood UA 2+ (*)     All other components within  normal limits    Narrative:     In and Out Cath as needed it patient unable to void  Specimen Source->Urine   URINALYSIS MICROSCOPIC - Abnormal; Notable for the following components:    RBC, UA 10 (*)     WBC, UA 7 (*)     All other components within normal limits    Narrative:     In and Out Cath as needed it patient unable to void  Specimen Source->Urine   LIPASE          Imaging Results              CT Abdomen Pelvis With IV Contrast NO Oral Contrast (Final result)  Result time 04/08/24 05:59:36      Final result by Ruddy Varela MD (04/08/24 05:59:36)                   Impression:      Mild diffuse colonic wall thickening suggesting mild pancolitis.      Electronically signed by: Ruddy Varela MD  Date:    04/08/2024  Time:    05:59               Narrative:    EXAMINATION:  CT ABDOMEN PELVIS WITH IV CONTRAST    CLINICAL HISTORY:  abdominal pain;    TECHNIQUE:  Low dose axial images, sagittal and coronal reformations were obtained from the lung bases to the pubic symphysis following the IV administration of 100 mL of Omnipaque 350 .  Oral contrast was not administered.    COMPARISON:  02/19/2022.    FINDINGS:  Abdomen:    - Lower thorax:Unremarkable.    - Lung bases: No infiltrates and no nodules.    - Liver: No focal mass.    - Gallbladder: No calcified gallstones.    - Bile Ducts: No evidence of intra or extra hepatic biliary ductal dilation.    - Spleen: Negative.    - Kidneys: No mass or hydronephrosis.    - Adrenals: Unremarkable.    - Pancreas: No mass or peripancreatic fat stranding.    - Retroperitoneum:  No significant adenopathy.    - Vascular: No abdominal aortic aneurysm.    - Abdominal wall:  Small fat containing umbilical hernia.    Pelvis:    No pelvic mass, adenopathy, or free fluid.    Bowel/Mesentery:    No bowel obstruction.  Normal appendix.  Mild diffuse colonic wall thickening suggesting mild pancolitis.    Bones:  No acute osseous abnormality and no suspicious lytic or blastic  lesion.                                       Medications   0.9%  NaCl infusion (1,000 mLs Intravenous New Bag 4/8/24 0603)   ondansetron injection 4 mg (4 mg Intravenous Given 4/8/24 0603)   iohexoL (OMNIPAQUE 350) injection 100 mL (100 mLs Intravenous Given 4/8/24 0540)     Medical Decision Making  Amount and/or Complexity of Data Reviewed  Radiology: ordered.    Risk  Prescription drug management.                          Medical Decision Making:   Initial Assessment:   48-year-old female with complaint of generalized abdominal pain associated with nausea, diarrhea over last several hours.  Differential Diagnosis:   Colitis, enteritis, diverticulitis, bowel obstruction  Clinical Tests:   Lab Tests: Ordered and Reviewed  Radiological Study: Ordered and Reviewed  Medical Tests: Ordered and Reviewed  ED Management:  Patient seen evaluated emergency department.  Patient here with complaint of abdominal pain and diarrhea which has been noted a proximally 2-3 hours prior to arrival to emergency department.  Patient workup in emergency department patient found to have pancolitis.  Patient was given IV hydration antiemetic therapy and pain medication.  On repeat abdominal exam patient abdomen found to be soft nontender nondistended with no rebound or guarding was noted.  Patient will be discharged with Flagyl as well as Levaquin to take over next 7 days.  She is to follow up with Gastroenterology this week.  She is return to emergency department if problems persist worsen or additional concerns.             Clinical Impression:  Final diagnoses:  [R10.84] Generalized abdominal pain (Primary)  [K52.9] Colitis          ED Disposition Condition    Discharge Stable          ED Prescriptions       Medication Sig Dispense Start Date End Date Auth. Provider    metroNIDAZOLE (FLAGYL) 500 MG tablet Take 1 tablet (500 mg total) by mouth 2 (two) times daily. for 7 days 14 tablet 4/8/2024 4/15/2024 Bob Reis MD     levoFLOXacin (LEVAQUIN) 750 MG tablet Take 1 tablet (750 mg total) by mouth once daily. 7 tablet 4/8/2024 -- Bob Reis MD          Follow-up Information       Follow up With Specialties Details Why Contact Info    Snehal Cast NP Internal Medicine Schedule an appointment as soon as possible for a visit in 1 week For recheck/continuing care [address] [facility] [address]  [place] [address]  [phone]      Wicho Ramos MD Gastroenterology Schedule an appointment as soon as possible for a visit in 1 week For recheck/continuing care [id number]   [place] [address]  [phone]               Bob Reis MD  04/08/24 0822

## 2024-10-15 ENCOUNTER — HOSPITAL ENCOUNTER (EMERGENCY)
Facility: HOSPITAL | Age: 49
Discharge: HOME | End: 2024-10-15
Attending: EMERGENCY MEDICINE

## 2024-10-15 VITALS
WEIGHT: 140 LBS | TEMPERATURE: 99 F | RESPIRATION RATE: 20 BRPM | HEART RATE: 77 BPM | SYSTOLIC BLOOD PRESSURE: 105 MMHG | DIASTOLIC BLOOD PRESSURE: 60 MMHG | BODY MASS INDEX: 27.48 KG/M2 | HEIGHT: 60 IN | OXYGEN SATURATION: 99 %

## 2024-10-15 DIAGNOSIS — R50.9 FEVER: ICD-10-CM

## 2024-10-15 DIAGNOSIS — B34.9 VIRAL SYNDROME: Primary | ICD-10-CM

## 2024-10-15 LAB
ADENOVIRUS: NOT DETECTED
ALBUMIN SERPL BCP-MCNC: 3.8 G/DL (ref 3.5–5.2)
ALP SERPL-CCNC: 45 U/L (ref 55–135)
ALT SERPL W/O P-5'-P-CCNC: 25 U/L (ref 10–44)
ANION GAP SERPL CALC-SCNC: 7 MMOL/L (ref 8–16)
AST SERPL-CCNC: 16 U/L (ref 10–40)
BASOPHILS # BLD AUTO: 0.04 K/UL (ref 0–0.2)
BASOPHILS NFR BLD: 0.4 % (ref 0–1.9)
BILIRUB SERPL-MCNC: 0.4 MG/DL (ref 0.1–1)
BNP SERPL-MCNC: 59 PG/ML (ref 0–99)
BORDETELLA PARAPERTUSSIS (IS1001): NOT DETECTED
BORDETELLA PERTUSSIS (PTXP): NOT DETECTED
BUN SERPL-MCNC: 16 MG/DL (ref 6–20)
CALCIUM SERPL-MCNC: 8.8 MG/DL (ref 8.7–10.5)
CHLAMYDIA PNEUMONIAE: NOT DETECTED
CHLORIDE SERPL-SCNC: 100 MMOL/L (ref 95–110)
CO2 SERPL-SCNC: 32 MMOL/L (ref 23–29)
CORONAVIRUS 229E, COMMON COLD VIRUS: NOT DETECTED
CORONAVIRUS HKU1, COMMON COLD VIRUS: NOT DETECTED
CORONAVIRUS NL63, COMMON COLD VIRUS: NOT DETECTED
CORONAVIRUS OC43, COMMON COLD VIRUS: NOT DETECTED
CREAT SERPL-MCNC: 0.7 MG/DL (ref 0.5–1.4)
D DIMER PPP IA.FEU-MCNC: 0.19 MG/L FEU (ref 0–0.49)
DIFFERENTIAL METHOD BLD: ABNORMAL
EOSINOPHIL # BLD AUTO: 0 K/UL (ref 0–0.5)
EOSINOPHIL NFR BLD: 0 % (ref 0–8)
ERYTHROCYTE [DISTWIDTH] IN BLOOD BY AUTOMATED COUNT: 14.6 % (ref 11.5–14.5)
EST. GFR  (NO RACE VARIABLE): >60 ML/MIN/1.73 M^2
FLUBV RNA NPH QL NAA+NON-PROBE: NOT DETECTED
GLUCOSE SERPL-MCNC: 107 MG/DL (ref 70–110)
GROUP A STREP, MOLECULAR: NEGATIVE
HCT VFR BLD AUTO: 41.2 % (ref 37–48.5)
HGB BLD-MCNC: 13.1 G/DL (ref 12–16)
HPIV1 RNA NPH QL NAA+NON-PROBE: NOT DETECTED
HPIV2 RNA NPH QL NAA+NON-PROBE: NOT DETECTED
HPIV3 RNA NPH QL NAA+NON-PROBE: NOT DETECTED
HPIV4 RNA NPH QL NAA+NON-PROBE: NOT DETECTED
HUMAN METAPNEUMOVIRUS: NOT DETECTED
IMM GRANULOCYTES # BLD AUTO: 0.35 K/UL (ref 0–0.04)
IMM GRANULOCYTES NFR BLD AUTO: 3.2 % (ref 0–0.5)
INFLUENZA A (SUBTYPES H1,H1-2009,H3): NOT DETECTED
INFLUENZA A, MOLECULAR: NEGATIVE
INFLUENZA B, MOLECULAR: NEGATIVE
LYMPHOCYTES # BLD AUTO: 0.7 K/UL (ref 1–4.8)
LYMPHOCYTES NFR BLD: 5.9 % (ref 18–48)
MCH RBC QN AUTO: 28.1 PG (ref 27–31)
MCHC RBC AUTO-ENTMCNC: 31.8 G/DL (ref 32–36)
MCV RBC AUTO: 88 FL (ref 82–98)
MONOCYTES # BLD AUTO: 0.6 K/UL (ref 0.3–1)
MONOCYTES NFR BLD: 5.5 % (ref 4–15)
MYCOPLASMA PNEUMONIAE: NOT DETECTED
NEUTROPHILS # BLD AUTO: 9.4 K/UL (ref 1.8–7.7)
NEUTROPHILS NFR BLD: 85 % (ref 38–73)
NRBC BLD-RTO: 0 /100 WBC
OHS QRS DURATION: 74 MS
OHS QTC CALCULATION: 403 MS
PLATELET # BLD AUTO: 238 K/UL (ref 150–450)
PMV BLD AUTO: 10.1 FL (ref 9.2–12.9)
POTASSIUM SERPL-SCNC: 4.3 MMOL/L (ref 3.5–5.1)
PROT SERPL-MCNC: 6.5 G/DL (ref 6–8.4)
RBC # BLD AUTO: 4.66 M/UL (ref 4–5.4)
RESPIRATORY INFECTION PANEL SOURCE: NORMAL
RSV RNA NPH QL NAA+NON-PROBE: NOT DETECTED
RV+EV RNA NPH QL NAA+NON-PROBE: NOT DETECTED
SARS-COV-2 RDRP RESP QL NAA+PROBE: NEGATIVE
SARS-COV-2 RNA RESP QL NAA+PROBE: NOT DETECTED
SODIUM SERPL-SCNC: 139 MMOL/L (ref 136–145)
SPECIMEN SOURCE: NORMAL
TROPONIN I SERPL HS-MCNC: 8.2 PG/ML (ref 0–14.9)
WBC # BLD AUTO: 11.1 K/UL (ref 3.9–12.7)

## 2024-10-15 PROCEDURE — U0002 COVID-19 LAB TEST NON-CDC: HCPCS | Performed by: EMERGENCY MEDICINE

## 2024-10-15 PROCEDURE — 80053 COMPREHEN METABOLIC PANEL: CPT | Performed by: EMERGENCY MEDICINE

## 2024-10-15 PROCEDURE — 99285 EMERGENCY DEPT VISIT HI MDM: CPT | Mod: 25

## 2024-10-15 PROCEDURE — 87502 INFLUENZA DNA AMP PROBE: CPT | Performed by: EMERGENCY MEDICINE

## 2024-10-15 PROCEDURE — 85025 COMPLETE CBC W/AUTO DIFF WBC: CPT | Performed by: EMERGENCY MEDICINE

## 2024-10-15 PROCEDURE — 87651 STREP A DNA AMP PROBE: CPT | Performed by: EMERGENCY MEDICINE

## 2024-10-15 PROCEDURE — 85379 FIBRIN DEGRADATION QUANT: CPT | Performed by: EMERGENCY MEDICINE

## 2024-10-15 PROCEDURE — 87581 M.PNEUMON DNA AMP PROBE: CPT | Performed by: EMERGENCY MEDICINE

## 2024-10-15 PROCEDURE — 83880 ASSAY OF NATRIURETIC PEPTIDE: CPT | Performed by: EMERGENCY MEDICINE

## 2024-10-15 PROCEDURE — 87798 DETECT AGENT NOS DNA AMP: CPT | Performed by: EMERGENCY MEDICINE

## 2024-10-15 PROCEDURE — 84484 ASSAY OF TROPONIN QUANT: CPT | Performed by: EMERGENCY MEDICINE

## 2024-10-15 NOTE — DISCHARGE INSTRUCTIONS
Alternate Motrin and Tylenol for fever   Return to the emergency department if your condition becomes worse, you develop fever despite medications, abdominal pain nausea vomiting diarrhea or any concerns

## 2024-10-15 NOTE — ED PROVIDER NOTES
Encounter Date: 10/15/2024       History     Chief Complaint   Patient presents with    Fever     Fever , sore throat , and right sided back pain when she takes a deep breath. X 5 days .     49-year-old female presents emergency department reports that she has had subjective fever for the last 3 days, sore throat, body aches, pain to her right upper back when she breathes, and a nonproductive cough patient denies any known ill contacts she denies any chest pain or shortness of breath.  The patient has taken no medications over-the-counter for relief of symptoms.    The history is provided by the patient.     Review of patient's allergies indicates:  No Known Allergies  Past Medical History:   Diagnosis Date    Cancer      Past Surgical History:   Procedure Laterality Date    HYSTERECTOMY       No family history on file.  Social History     Tobacco Use    Smoking status: Unknown   Substance Use Topics    Alcohol use: No    Drug use: No     Review of Systems   Constitutional:  Positive for chills and fever.   HENT:  Positive for congestion, rhinorrhea and sore throat.    Respiratory:  Positive for cough. Negative for shortness of breath and stridor.    Cardiovascular:  Negative for chest pain, palpitations and leg swelling.   Gastrointestinal: Negative.    Genitourinary: Negative.    Musculoskeletal: Negative.    Neurological: Negative.    Hematological: Negative.    Psychiatric/Behavioral: Negative.     All other systems reviewed and are negative.      Physical Exam     Initial Vitals [10/15/24 1011]   BP Pulse Resp Temp SpO2   135/77 61 16 98.7 °F (37.1 °C) 99 %      MAP       --         Physical Exam    Nursing note and vitals reviewed.  Constitutional: She appears well-developed and well-nourished.   HENT:   Head: Normocephalic and atraumatic.   Right Ear: External ear normal.   Left Ear: External ear normal.   Eyes: Conjunctivae and EOM are normal. Pupils are equal, round, and reactive to light.   Neck: Neck  supple.   Normal range of motion.  Cardiovascular:  Normal rate, regular rhythm, normal heart sounds and intact distal pulses.           Pulmonary/Chest: Breath sounds normal. No respiratory distress. She has no wheezes. She has no rhonchi. She has no rales. She exhibits no tenderness.   Abdominal: Abdomen is soft. Bowel sounds are normal. She exhibits no distension.   Musculoskeletal:      Cervical back: Normal range of motion and neck supple.     Neurological: She is alert and oriented to person, place, and time. She has normal strength. GCS score is 15. GCS eye subscore is 4. GCS verbal subscore is 5. GCS motor subscore is 6.   Skin: Skin is warm. No rash noted.   Psychiatric: She has a normal mood and affect.         ED Course   Procedures  Labs Reviewed   CBC W/ AUTO DIFFERENTIAL - Abnormal       Result Value    WBC 11.10      RBC 4.66      Hemoglobin 13.1      Hematocrit 41.2      MCV 88      MCH 28.1      MCHC 31.8 (*)     RDW 14.6 (*)     Platelets 238      MPV 10.1      Immature Granulocytes 3.2 (*)     Gran # (ANC) 9.4 (*)     Immature Grans (Abs) 0.35 (*)     Lymph # 0.7 (*)     Mono # 0.6      Eos # 0.0      Baso # 0.04      nRBC 0      Gran % 85.0 (*)     Lymph % 5.9 (*)     Mono % 5.5      Eosinophil % 0.0      Basophil % 0.4      Differential Method Automated     COMPREHENSIVE METABOLIC PANEL - Abnormal    Sodium 139      Potassium 4.3      Chloride 100      CO2 32 (*)     Glucose 107      BUN 16      Creatinine 0.7      Calcium 8.8      Total Protein 6.5      Albumin 3.8      Total Bilirubin 0.4      Alkaline Phosphatase 45 (*)     AST 16      ALT 25      eGFR >60.0      Anion Gap 7 (*)    GROUP A STREP, MOLECULAR    Group A Strep, Molecular Negative     RESPIRATORY INFECTION PANEL (PCR), NASOPHARYNGEAL    Respiratory Infection Panel Source NP swab      Adenovirus Not Detected      Coronavirus 229E, Common Cold Virus Not Detected      Coronavirus HKU1, Common Cold Virus Not Detected      Coronavirus  NL63, Common Cold Virus Not Detected      Coronavirus OC43, Common Cold Virus Not Detected      SARS-CoV2 (COVID-19) Qualitative PCR Not Detected      Human Metapneumovirus Not Detected      Human Rhinovirus/Enterovirus Not Detected      Influenza A (subtypes H1, H1-2009,H3) Not Detected      Influenza B Not Detected      Parainfluenza Virus 1 Not Detected      Parainfluenza Virus 2 Not Detected      Parainfluenza Virus 3 Not Detected      Parainfluenza Virus 4 Not Detected      Respiratory Syncytial Virus Not Detected      Bordetella Parapertussis (SK1846) Not Detected      Bordetella pertussis (ptxP) Not Detected      Chlamydia pneumoniae Not Detected      Mycoplasma pneumoniae Not Detected      Narrative:     Assay not valid for lower respiratory specimens, alternate  testing required.  Respiratory Infection Panel source->NP Swab   SARS-COV-2 RNA AMPLIFICATION, QUAL    SARS-CoV-2 RNA, Amplification, Qual Negative     INFLUENZA A AND B ANTIGEN    Influenza A, Molecular Negative      Influenza B, Molecular Negative      Flu A & B Source Nasal swab      Narrative:     Specimen Source->Nasopharyngeal Swab   TROPONIN I HIGH SENSITIVITY    Troponin I High Sensitivity 8.2     B-TYPE NATRIURETIC PEPTIDE    BNP 59     D DIMER, QUANTITATIVE    D-Dimer 0.19          ECG Results              EKG 12-lead (In process)        Collection Time Result Time QRS Duration OHS QTC Calculation    10/15/24 11:45:04 10/15/24 12:00:13 74 403                     In process by Interface, Lab In [facility] (10/15/24 12:00:17)                   Narrative:    Test Reason : R06.02,    Vent. Rate : 055 BPM     Atrial Rate : 055 BPM     P-R Int : 104 ms          QRS Dur : 074 ms      QT Int : 422 ms       P-R-T Axes : 055 034 051 degrees     QTc Int : 403 ms    Sinus bradycardia with short IN  Otherwise normal ECG  No previous ECGs available    Referred By: [email or web address]   SELF           Confirmed By:                                   Imaging Results               X-Ray Chest PA And Lateral (Final result)  Result time 10/15/24 11:01:04      Final result by Last Frank MD (10/15/24 11:01:04)                   Impression:      1. Mild left basilar atelectasis.  2. No other significant findings.      Electronically signed by: Last Frank  Date:    10/15/2024  Time:    11:01               Narrative:    EXAMINATION:  XR CHEST PA AND LATERAL    CLINICAL HISTORY:  Fever, unspecified    COMPARISON:  Two    FINDINGS:  PA and lateral views demonstrate a normal appearance of the heart and mediastinum.  No infiltrates or effusions are identified.  There is focal mild linear atelectasis at the left lung base.  No acute osseous abnormalities are demonstrated.                                       Medications - No data to display  Medical Decision Making  49-year-old female presents emergency department reports that she has had subjective fever for the last 3 days, sore throat, body aches, pain to her right upper back when she breathes, and a nonproductive cough patient denies any known ill contacts she denies any chest pain or shortness of breath.  The patient has taken no medications over-the-counter for relief of symptoms.    The history is provided by the patient.     Considerations include but not limited to, COVID, influenza, strep pharyngitis, pneumonia, PE, ACS    49-year-old female presents emergency department with complaint of fever sore throat body aches pain to her back when she breathes nonproductive cough patient is very well-appearing on physical exam she is not hypoxic she has no respiratory distress x-ray imaging revealed no consolidated pneumonia D-dimer was negative and patient has no risk factors for PE making this less likely.  COVID influenza and strep testing are negative secondary to this further evaluation was performed with labs which were unremarkable, high sensitivity troponin was normal EKG revealed normal sinus Mane with no ST  elevation or ischemic changes.  Patient has no evidence of leukocytosis on lab work, respiratory viral panel is negative.  The patient has had no fever while in the emergency department and has remained hemodynamically stable I have discussed all these findings with the patient she is aware that this could be another virus she has had no chest pain shortness of breath abdominal pain nausea vomiting or diarrhea while in the ER or for the last few days.  The patient was instructed to return to the ER if her symptoms become worse or change in any way.    Amount and/or Complexity of Data Reviewed  Labs: ordered. Decision-making details documented in ED Course.  Radiology: ordered. Decision-making details documented in ED Course.                                      Clinical Impression:  Final diagnoses:  [R50.9] Fever  [B34.9] Viral syndrome (Primary)          ED Disposition Condition    Discharge Stable          ED Prescriptions    None       Follow-up Information       Follow up With Specialties Details Why Contact Demetria    [facility]  Schedule an appointment as soon as possible for a visit in 2 days  [address]  [place] [address]  [phone]               Naima Suresh FNP  10/15/24 1502

## 2024-11-24 ENCOUNTER — HOSPITAL ENCOUNTER (EMERGENCY)
Facility: HOSPITAL | Age: 49
Discharge: HOME | End: 2024-11-24
Attending: EMERGENCY MEDICINE

## 2024-11-24 VITALS
WEIGHT: 143 LBS | BODY MASS INDEX: 27.93 KG/M2 | TEMPERATURE: 98 F | SYSTOLIC BLOOD PRESSURE: 132 MMHG | RESPIRATION RATE: 20 BRPM | HEART RATE: 70 BPM | OXYGEN SATURATION: 100 % | DIASTOLIC BLOOD PRESSURE: 78 MMHG

## 2024-11-24 DIAGNOSIS — T16.1XXA FOREIGN BODY IN AURICLE OF RIGHT EAR, INITIAL ENCOUNTER: Primary | ICD-10-CM

## 2024-11-24 PROCEDURE — 99282 EMERGENCY DEPT VISIT SF MDM: CPT

## 2024-11-24 PROCEDURE — 25000003 PHARM REV CODE 250: Performed by: NURSE PRACTITIONER

## 2024-11-24 RX ADMIN — Medication 5 DROP: at 08:11

## 2024-11-24 NOTE — ED PROVIDER NOTES
"Encounter Date: 11/24/2024       History     Chief Complaint   Patient presents with    Bug in right ear     Felt it happen around midnight. "I can feel it moving sometimes"     Presents with complaint of feeling as if a bug is moving around her right ear.  Onset this morning.  Patient was Japanese speaking.  I have used the  for the exam.  Hit her 9-year-old daughter is here also the daughter speaks English too.      Review of patient's allergies indicates:  No Known Allergies  Past Medical History:   Diagnosis Date    Cancer      Past Surgical History:   Procedure Laterality Date    HYSTERECTOMY       No family history on file.  Social History     Tobacco Use    Smoking status: Unknown   Substance Use Topics    Alcohol use: No    Drug use: No     Review of Systems   Constitutional:  Negative for fever.   Respiratory:  Negative for cough, shortness of breath and wheezing.    Cardiovascular:  Negative for chest pain, palpitations and leg swelling.   Gastrointestinal:  Negative for abdominal pain, diarrhea, nausea and vomiting.   Musculoskeletal:  Negative for back pain and gait problem.   Skin:  Negative for rash.   Neurological:  Negative for dizziness and weakness.       Physical Exam     Initial Vitals [11/24/24 0807]   BP Pulse Resp Temp SpO2   128/77 65 16 97.8 °F (36.6 °C) 100 %      MAP       --         Physical Exam    Constitutional: She appears well-developed and well-nourished. No distress.   HENT:   Head: Normocephalic. Mouth/Throat: Oropharynx is clear and moist.   There is what appears to be a possible probable spider in the right ear.  I have placed some peroxide to the ear trying to kill it as it seems as if it was still move   Eyes: Conjunctivae are normal.   Neck: Neck supple.   Normal range of motion.  Cardiovascular:  Normal rate and regular rhythm.           Pulmonary/Chest: Breath sounds normal. No respiratory distress.   Musculoskeletal:         General: Normal range of motion.      " "Cervical back: Normal range of motion and neck supple.     Neurological: She is alert and oriented to person, place, and time. No sensory deficit. GCS score is 15. GCS eye subscore is 4. GCS verbal subscore is 5. GCS motor subscore is 6.   Skin: Skin is warm and dry. Capillary refill takes less than 2 seconds.   Psychiatric: She has a normal mood and affect. Thought content normal.         ED Course   Procedures  Labs Reviewed - No data to display       Imaging Results    None          Medications   carbamide peroxide 6.5 % otic solution 5 drop (5 drops Right Ear Given 11/24/24 0845)     Medical Decision Making  Her minutes presents with concerns for a "bug" in her right ear.  Patient reports she can not feel it moving around.  Onset this morning.    Amount and/or Complexity of Data Reviewed  Discussion of management or test interpretation with external provider(s): I have used Debrox peroxide irrigation to this right ear.  I was able to get 1 leg out.  Can not get the body increase your out.  Have given her an ambulatory referral to a ear nose and throat doctor.  Used the  to explained this to the patient.  She was very calm.  She verbalized understanding.  She was discharge in no acute distress.    Risk  OTC drugs.              Attending Attestation:     Physician Attestation Statement for NP/PA:       Other NP/PA Attestation Additions:    History of Present Illness: 49-year-old female presented for a possible bug in the ear.    Medical Decision Making: This patient was seen by a mid-level provider in the department.  I was available in the department for consultation but did not examine the patient.  I was not consulted about the patient's care while they were in the department.                                    Clinical Impression:  Final diagnoses:  [T16.1XXA] Foreign body in auricle of right ear, initial encounter (Primary)          ED Disposition Condition    Discharge Stable          ED " Prescriptions    None       Follow-up Information    None          Laura Nunez NP  11/24/24 1727       Keith Bearden MD  11/24/24 4577

## 2024-11-24 NOTE — DISCHARGE INSTRUCTIONS
I have placed a ambulatory referral to ENT.  Someone will call you in the next 2 days with an appointment and time.  I have given you a list of ear nose and throat doctors please call to see if you can not get in sooner.